# Patient Record
Sex: FEMALE | Race: WHITE | NOT HISPANIC OR LATINO | URBAN - NONMETROPOLITAN AREA
[De-identification: names, ages, dates, MRNs, and addresses within clinical notes are randomized per-mention and may not be internally consistent; named-entity substitution may affect disease eponyms.]

---

## 2019-01-08 NOTE — PROCEDURE NOTE: SURGICAL
"<span style=""font-weight:bold;"">MR #:</span> [id number]<br /><br /><span style=""font-weight:bold;"">PREOPERATIVE DIAGNOSIS:</span> Cataract

## 2019-01-15 NOTE — PROCEDURE NOTE: SURGICAL
"<span style=""font-weight:bold;"">MR #:</span> [id number]<br /><br /><span style=""font-weight:bold;"">PREOPERATIVE DIAGNOSIS:</span> Cataract

## 2019-01-15 NOTE — PATIENT DISCUSSION
Patient advised of the right to post-operative care by the surgeon. Patient is fully informed of, and agreed to, co-management with their primary optometric physician. Post-operative care by the surgeon is not medically necessary and co-management is clinically appropriate. Patient has received itemization of fees related to cataract surgery. Transfer of care letter completed for the patient. Transfer care of the left eye to Dr. Olayinka Boyle on 1/15/19. Patient instructed to call immediately if any new distortion, blurring, decreased vision or eye pain.

## 2019-01-15 NOTE — PATIENT DISCUSSION
Cataract surgery has been performed in the first eye and activities of daily living are still impaired. The patient would like to proceed with cataract surgery in the second eye as scheduled. The patient elects Basic OS, goal of emmetropia.

## 2019-11-21 ENCOUNTER — IMPORTED ENCOUNTER (OUTPATIENT)
Dept: URBAN - NONMETROPOLITAN AREA CLINIC 1 | Facility: CLINIC | Age: 59
End: 2019-11-21

## 2019-11-21 PROBLEM — H25.13: Noted: 2019-11-21

## 2019-11-21 PROBLEM — H40.1131: Noted: 2019-11-21

## 2019-11-21 PROCEDURE — 99212 OFFICE O/P EST SF 10 MIN: CPT

## 2019-11-21 NOTE — PATIENT DISCUSSION
*PRIMARY OPEN ANGLE GLAUCOMA:. decreased IOP-  Discussed findings of exam in detail with the patient. -  discussed the chronic progressive nature of this disease and various treatment options. -  importance of good compliance with medications was emphasized. vf todayCataract(s)-Visually significant.-Cataract(s) causing symptomatic impairment of visual function not correctable with a tolerable change in glasses or contact lenses lighting or non-operative means resulting in specific activity limitations and/or participation restrictions including but not limited to reading viewing television driving or meeting vocational or recreational needs. -Expectation is clearer vision and reduced glare disability after cataract removal.-Refer to Dr Veronica Christensen for cataract evaluationCONSIDER YAG PI CONSULT PRIOR TO SURGERYPT TO CONTACT [facility] THEN REFER; Dr's Notes: 40 dollar visits

## 2020-02-28 ENCOUNTER — IMPORTED ENCOUNTER (OUTPATIENT)
Dept: URBAN - NONMETROPOLITAN AREA CLINIC 1 | Facility: CLINIC | Age: 60
End: 2020-02-28

## 2020-02-28 PROCEDURE — 92012 INTRM OPH EXAM EST PATIENT: CPT

## 2020-02-28 NOTE — PATIENT DISCUSSION
Narrow Angle Anatomy-Explained narrow anatomy and what it can mean if it is left untreated. -Reviewed the signs and symptoms of a narrow angle attack.-Discussed RBAs of Laser Peripheral Iridotomy and recommend proceeding with LPI OD then return for LPI OS.-Discussed with patient that this is recommended before considering cataract surgery cannot wait. *PRIMARY OPEN ANGLE GLAUCOMA:. decreased IOP-  Discussed findings of exam in detail with the patient. -  discussed the chronic progressive nature of this disease and various treatment options. -  importance of good compliance with medications was emphasized. -Continue Timoptic QD OU-IOP today 14 OD 15OS; 's Notes: 40 dollar visits<br />

## 2020-03-02 ENCOUNTER — IMPORTED ENCOUNTER (OUTPATIENT)
Dept: URBAN - NONMETROPOLITAN AREA CLINIC 1 | Facility: CLINIC | Age: 60
End: 2020-03-02

## 2020-03-02 PROBLEM — H40.033: Noted: 2020-03-02

## 2020-03-02 PROBLEM — H40.1131: Noted: 2020-03-02

## 2020-03-02 PROBLEM — H25.13: Noted: 2020-03-02

## 2020-03-02 PROCEDURE — 66761 REVISION OF IRIS: CPT

## 2020-03-02 NOTE — PATIENT DISCUSSION
Narrow Angle Anatomy-Explained narrow anatomy and what it can mean if it is left untreated. -Reviewed the signs and symptoms of a narrow angle attack.-Discussed RBAs of Laser Peripheral Iridotomy and recommend proceeding with LPI OD then return for LPI OS w/ Dr. Elvis Parmar with patient that this is recommended before considering cataract surgery cannot wait.; Dr's Notes: 40 dollar visits

## 2020-03-04 ENCOUNTER — IMPORTED ENCOUNTER (OUTPATIENT)
Dept: URBAN - NONMETROPOLITAN AREA CLINIC 1 | Facility: CLINIC | Age: 60
End: 2020-03-04

## 2020-03-04 PROCEDURE — 66761 REVISION OF IRIS: CPT

## 2020-03-04 NOTE — PATIENT DISCUSSION
Narrow Angle Anatomy-Explained narrow anatomy and what it can mean if it is left untreated. -Reviewed the signs and symptoms of a narrow angle attack.-Discussed RBAs of Laser Peripheral Iridotomy and recommend proceeding with LPI OS-Discussed with patient that this is recommended before considering cataract surgery cannot wait. *PRIMARY OPEN ANGLE GLAUCOMA:. decreased IOP-  Discussed findings of exam in detail with the patient. -  discussed the chronic progressive nature of this disease and various treatment options. -  importance of good compliance with medications was emphasized. -Continue Timoptic QD OU; 's Notes: 40 dollar visits

## 2020-03-11 ENCOUNTER — IMPORTED ENCOUNTER (OUTPATIENT)
Dept: URBAN - NONMETROPOLITAN AREA CLINIC 1 | Facility: CLINIC | Age: 60
End: 2020-03-11

## 2020-03-11 PROCEDURE — 99024 POSTOP FOLLOW-UP VISIT: CPT

## 2020-03-11 NOTE — PATIENT DISCUSSION
s/p yag pipatent todayif stable on next visit then refer for cat evalNarrow Angle Anatomy-Explained narrow anatomy and what it can mean if it is left untreated. -Reviewed the signs and symptoms of a narrow angle attack.-Discussed RBAs of Laser Peripheral Iridotomy and recommend proceeding with LPI OD then return for LPI OS.-Discussed with patient that this is recommended before considering cataract surgery cannot wait. *PRIMARY OPEN ANGLE GLAUCOMA:. decreased IOP-  Discussed findings of exam in detail with the patient. -  discussed the chronic progressive nature of this disease and various treatment options. -  importance of good compliance with medications was emphasized. -Continue Timoptic QD OU-IOP today 14 OD 15OS; 's Notes: 40 dollar visits

## 2020-05-21 ENCOUNTER — IMPORTED ENCOUNTER (OUTPATIENT)
Dept: URBAN - NONMETROPOLITAN AREA CLINIC 1 | Facility: CLINIC | Age: 60
End: 2020-05-21

## 2020-05-21 PROCEDURE — 99213 OFFICE O/P EST LOW 20 MIN: CPT

## 2020-06-08 ENCOUNTER — IMPORTED ENCOUNTER (OUTPATIENT)
Dept: URBAN - NONMETROPOLITAN AREA CLINIC 1 | Facility: CLINIC | Age: 60
End: 2020-06-08

## 2020-06-08 PROBLEM — H40.033: Noted: 2020-06-08

## 2020-06-08 PROBLEM — H25.13: Noted: 2020-06-08

## 2020-06-08 PROBLEM — H40.1131: Noted: 2020-06-08

## 2020-06-08 PROCEDURE — 92014 COMPRE OPH EXAM EST PT 1/>: CPT

## 2020-06-08 NOTE — PATIENT DISCUSSION
Cataract(s)-Visually significant cataract OU .-Cataract(s) causing symptomatic impairment of visual function not correctable with a tolerable change in glasses or contact lenses lighting or non-operative means resulting in specific activity limitations and/or participation restrictions including but not limited to reading viewing television driving or meeting vocational or recreational needs. -Expectation is clearer vision and functional improvement in symptoms as well as reduced glare disability after cataract removal.-Order IOLMaster and OPD today. -Recommend standard/traditional based on today's OPD testing and lifestyle questionnaire.-All questions were answered regarding surgery including pre and post-op medications appointments activity restrictions and anesthetic usage.-The risks benefits and alternatives and special risk factors for the patient were discussed in detail including but not limited to: bleeding infection retinal detachment vitreous loss problems with the implant and possible need for additional surgery.-Although rare the possibility of complete vision loss was discussed.-The possible need for glasses post-operatively was discussed.-Order medical clearance exam based on history of HTN. -Patient elects to proceed with cataract surgery OD . Will schedule at patient's convenience and re-evaluate OS  in the future. COAG-Appears stable. -Continue drops as directed.  Stressed importance of compliance.; 's Notes: 40 dollar visits

## 2020-08-29 PROBLEM — H25.13: Noted: 2020-08-29

## 2020-08-29 PROBLEM — H40.033: Noted: 2020-08-29

## 2020-08-29 PROBLEM — H40.1131: Noted: 2020-08-29

## 2020-09-02 ENCOUNTER — IMPORTED ENCOUNTER (OUTPATIENT)
Dept: URBAN - NONMETROPOLITAN AREA CLINIC 1 | Facility: CLINIC | Age: 60
End: 2020-09-02

## 2020-09-02 PROBLEM — H25.13: Noted: 2020-09-02

## 2020-09-02 PROBLEM — E78.5: Noted: 2020-09-02

## 2020-09-02 PROBLEM — Z01.818: Noted: 2020-09-02

## 2020-09-02 PROBLEM — I10: Noted: 2020-09-02

## 2020-09-02 PROBLEM — Z98.41: Noted: 2020-09-11

## 2020-09-02 NOTE — PATIENT DISCUSSION
Medical Clearance-Medical clearance done today. -No outstanding concerns that would preclude surgery.-Patient is cleared to proceed with scheduled surgery.; 's Notes: 40 dollar visits

## 2020-09-11 ENCOUNTER — IMPORTED ENCOUNTER (OUTPATIENT)
Dept: URBAN - NONMETROPOLITAN AREA CLINIC 1 | Facility: CLINIC | Age: 60
End: 2020-09-11

## 2020-09-11 PROCEDURE — 99024 POSTOP FOLLOW-UP VISIT: CPT

## 2020-09-11 NOTE — PATIENT DISCUSSION
s/p PCIOL-Pt doing well s/p PCIOL. -Continue post-op gtts according to instruction sheet and sleep with eye shield over eye for 7 nights.-Avoid bending at the waist lifting anything over 5lbs and dirty or nicky environments. -RTC 1 week POV/Reeval OS Dr. Mota School.; 's Notes: 40 dollar visits

## 2020-09-16 ENCOUNTER — IMPORTED ENCOUNTER (OUTPATIENT)
Dept: URBAN - NONMETROPOLITAN AREA CLINIC 1 | Facility: CLINIC | Age: 60
End: 2020-09-16

## 2020-09-16 PROBLEM — H25.12: Noted: 2020-09-16

## 2020-09-16 PROBLEM — E78.5: Noted: 2020-09-16

## 2020-09-16 PROBLEM — I10: Noted: 2020-09-16

## 2020-09-16 PROBLEM — Z98.41: Noted: 2020-09-11

## 2020-09-16 PROBLEM — Z01.818: Noted: 2020-09-16

## 2020-09-16 PROCEDURE — 99024 POSTOP FOLLOW-UP VISIT: CPT

## 2020-09-16 NOTE — PATIENT DISCUSSION
Cataract(s)-Visually significant cataract OS . -Cataract(s) causing symptomatic impairment of visual function not correctable with a tolerable change in glasses or contact lenses lighting or non-operative means resulting in specific activity limitations and/or participation restrictions including but not limited to reading viewing television driving or meeting vocational or recreational needs. -Expectation is clearer vision and functional improvement in symptoms as well as reduced glare disability after cataract removal.-Recommend Stand/Trad based on previous OPD testing and lifestyle questionnaire.-All questions were answered regarding surgery including pre and post-op medications appointments activity restrictions and anesthetic usage.-The risks benefits and alternatives and special risk factors for the patient were discussed in detail including but not limited to: bleeding infection retinal detachment vitreous loss problems with the implant and possible need for additional surgery.-Although rare the possibility of complete vision loss was discussed.-The need for glasses post-operatively was discussed.-Patient elects to proceed with cataract surgery OS. s/p PCIOL-Pt doing well at 1 week s/p PCIOL. -Continue post-op gtts according to instruction sheet. -IOP OD 35 -Cont timoptic qd OS START Combigan bid OD -MRx today OD -0.75 -1.50 175 -Gonio OS narrowed open moderate pigment -Okay to resume usual activites and d/c eye shield.; 's Notes: 40 dollar visits

## 2020-10-08 ENCOUNTER — IMPORTED ENCOUNTER (OUTPATIENT)
Dept: URBAN - NONMETROPOLITAN AREA CLINIC 1 | Facility: CLINIC | Age: 60
End: 2020-10-08

## 2020-10-08 PROBLEM — Z96.1: Noted: 2020-10-08

## 2020-10-08 PROCEDURE — 99024 POSTOP FOLLOW-UP VISIT: CPT

## 2021-02-12 ENCOUNTER — PREPPED CHART (OUTPATIENT)
Dept: RURAL CLINIC 1 | Facility: CLINIC | Age: 61
End: 2021-02-12

## 2021-02-12 ENCOUNTER — IMPORTED ENCOUNTER (OUTPATIENT)
Dept: URBAN - NONMETROPOLITAN AREA CLINIC 1 | Facility: CLINIC | Age: 61
End: 2021-02-12

## 2021-02-12 PROCEDURE — 92014 COMPRE OPH EXAM EST PT 1/>: CPT

## 2022-03-27 ASSESSMENT — TONOMETRY
OD_IOP_MMHG: 17
OS_IOP_MMHG: 19

## 2022-04-09 ASSESSMENT — VISUAL ACUITY
OD_PH: 20/100
OD_PH: 20/20
OS_SC: 20/60
OD_CC: J7
OS_SC: 20/30
OD_SC: 20/60-2
OD_PH: 20/40
OD_PH: 20/30
OD_SC: 20/100
OD_SC: 20/60-
OS_SC: 20/50
OS_SC: 20/80
OS_SC: 20/50
OS_GLARE: 20/40
OS_GLARE: 20/40
OS_PH: 20/30-1
OD_CC: 20/80
OS_CC: J10
OD_SC: 20/60
OS_AM: 20/30
OD_SC: 20/30
OD_SC: 20/40
OD_PAM: 20/25
OS_SC: 20/50-1
OS_PH: 20/40
OS_SC: 20/60
OD_SC: 20/50-2
OD_GLARE: 20/70
OD_CC: 20/50-
OS_AM: 20/30
OS_PH: 20/80+2

## 2022-04-09 ASSESSMENT — TONOMETRY
OS_IOP_MMHG: 19
OD_IOP_MMHG: 17
OD_IOP_MMHG: 14
OS_IOP_MMHG: 15
OS_IOP_MMHG: 22
OD_IOP_MMHG: 35
OD_IOP_MMHG: 37
OD_IOP_MMHG: 15
OD_IOP_MMHG: 19
OD_IOP_MMHG: 22
OS_IOP_MMHG: 22
OS_IOP_MMHG: 15
OD_IOP_MMHG: 19
OS_IOP_MMHG: 16
OS_IOP_MMHG: 19

## 2022-04-26 ENCOUNTER — ESTABLISHED PATIENT (OUTPATIENT)
Dept: RURAL CLINIC 1 | Facility: CLINIC | Age: 62
End: 2022-04-26

## 2022-04-26 DIAGNOSIS — H25.12: ICD-10-CM

## 2022-04-26 DIAGNOSIS — H40.1131: ICD-10-CM

## 2022-04-26 DIAGNOSIS — Z96.1: ICD-10-CM

## 2022-04-26 PROCEDURE — S0621 ROUTINE OPHTHALMOLOGICAL EXA: HCPCS

## 2022-04-26 ASSESSMENT — VISUAL ACUITY
OD_CC: 20/40
OS_CC: 20/40

## 2022-04-26 ASSESSMENT — TONOMETRY
OD_IOP_MMHG: 18
OS_IOP_MMHG: 18

## 2022-12-12 ENCOUNTER — ESTABLISHED PATIENT (OUTPATIENT)
Dept: RURAL CLINIC 1 | Facility: CLINIC | Age: 62
End: 2022-12-12

## 2022-12-12 PROCEDURE — 99211 OFF/OP EST MAY X REQ PHY/QHP: CPT

## 2022-12-12 ASSESSMENT — TONOMETRY
OD_IOP_MMHG: 19
OS_IOP_MMHG: 20

## 2022-12-12 ASSESSMENT — VISUAL ACUITY
OD_CC: 20/60
OS_AM: 20/40
OS_CC: 20/40
OD_PAM: 20/50

## 2023-09-15 ENCOUNTER — FOLLOW UP (OUTPATIENT)
Dept: RURAL CLINIC 1 | Facility: CLINIC | Age: 63
End: 2023-09-15

## 2023-09-15 DIAGNOSIS — Z96.1: ICD-10-CM

## 2023-09-15 DIAGNOSIS — H40.1131: ICD-10-CM

## 2023-09-15 DIAGNOSIS — H25.12: ICD-10-CM

## 2023-09-15 PROCEDURE — S0621 ROUTINE OPHTHALMOLOGICAL EXA: HCPCS

## 2023-09-15 ASSESSMENT — TONOMETRY
OD_IOP_MMHG: 17
OS_IOP_MMHG: 17

## 2023-09-15 ASSESSMENT — VISUAL ACUITY
OD_SC: 20/40-1
OS_SC: 20/40-1
OD_PH: 20/30

## 2024-04-15 ENCOUNTER — FOLLOW UP (OUTPATIENT)
Dept: RURAL CLINIC 1 | Facility: CLINIC | Age: 64
End: 2024-04-15

## 2024-04-15 DIAGNOSIS — H25.12: ICD-10-CM

## 2024-04-15 DIAGNOSIS — Z96.1: ICD-10-CM

## 2024-04-15 DIAGNOSIS — H40.1131: ICD-10-CM

## 2024-04-15 PROCEDURE — S0621AEC ROUTINE OPH EXAM INCLUDES REF/ EST PATIENT

## 2024-04-15 ASSESSMENT — VISUAL ACUITY
OD_CC: 20/30
OU_CC: 20/30
OS_CC: 20/40+2

## 2024-04-15 ASSESSMENT — TONOMETRY
OS_IOP_MMHG: 19
OD_IOP_MMHG: 19